# Patient Record
Sex: FEMALE | Race: WHITE
[De-identification: names, ages, dates, MRNs, and addresses within clinical notes are randomized per-mention and may not be internally consistent; named-entity substitution may affect disease eponyms.]

---

## 2020-04-26 ENCOUNTER — MESSAGE (OUTPATIENT)
Age: 35
End: 2020-04-26

## 2020-05-04 LAB
SARS-COV-2 IGG SERPL IA-ACNC: 0.3 RATIO
SARS-COV-2 IGG SERPL QL IA: NEGATIVE

## 2023-11-16 PROBLEM — Z00.00 ENCOUNTER FOR PREVENTIVE HEALTH EXAMINATION: Status: ACTIVE | Noted: 2023-11-16

## 2023-12-03 PROBLEM — Z00.01 ENCOUNTER FOR WELL ADULT EXAM WITH ABNORMAL FINDINGS: Status: ACTIVE | Noted: 2023-12-03

## 2023-12-07 ENCOUNTER — LABORATORY RESULT (OUTPATIENT)
Age: 38
End: 2023-12-07

## 2023-12-07 ENCOUNTER — APPOINTMENT (OUTPATIENT)
Dept: FAMILY MEDICINE | Facility: CLINIC | Age: 38
End: 2023-12-07
Payer: COMMERCIAL

## 2023-12-07 VITALS
TEMPERATURE: 98 F | HEIGHT: 64 IN | OXYGEN SATURATION: 98 % | HEART RATE: 106 BPM | WEIGHT: 211 LBS | DIASTOLIC BLOOD PRESSURE: 90 MMHG | SYSTOLIC BLOOD PRESSURE: 132 MMHG | BODY MASS INDEX: 36.02 KG/M2

## 2023-12-07 DIAGNOSIS — Z82.49 FAMILY HISTORY OF ISCHEMIC HEART DISEASE AND OTHER DISEASES OF THE CIRCULATORY SYSTEM: ICD-10-CM

## 2023-12-07 DIAGNOSIS — Z83.3 FAMILY HISTORY OF DIABETES MELLITUS: ICD-10-CM

## 2023-12-07 DIAGNOSIS — Z78.9 OTHER SPECIFIED HEALTH STATUS: ICD-10-CM

## 2023-12-07 DIAGNOSIS — Z86.2 PERSONAL HISTORY OF DISEASES OF THE BLOOD AND BLOOD-FORMING ORGANS AND CERTAIN DISORDERS INVOLVING THE IMMUNE MECHANISM: ICD-10-CM

## 2023-12-07 DIAGNOSIS — J45.909 UNSPECIFIED ASTHMA, UNCOMPLICATED: ICD-10-CM

## 2023-12-07 DIAGNOSIS — E66.9 OBESITY, UNSPECIFIED: ICD-10-CM

## 2023-12-07 DIAGNOSIS — Z00.01 ENCOUNTER FOR GENERAL ADULT MEDICAL EXAMINATION WITH ABNORMAL FINDINGS: ICD-10-CM

## 2023-12-07 PROCEDURE — 99385 PREV VISIT NEW AGE 18-39: CPT | Mod: 25

## 2023-12-07 PROCEDURE — 36415 COLL VENOUS BLD VENIPUNCTURE: CPT

## 2023-12-07 RX ORDER — ALBUTEROL SULFATE 90 UG/1
108 (90 BASE) INHALANT RESPIRATORY (INHALATION)
Qty: 1 | Refills: 3 | Status: ACTIVE | COMMUNITY
Start: 2023-12-07 | End: 1900-01-01

## 2023-12-08 LAB
ALBUMIN SERPL ELPH-MCNC: 4.5 G/DL
ALP BLD-CCNC: 114 U/L
ALT SERPL-CCNC: 28 U/L
ANION GAP SERPL CALC-SCNC: 10 MMOL/L
APPEARANCE: CLEAR
AST SERPL-CCNC: 31 U/L
BASOPHILS # BLD AUTO: 0.07 K/UL
BASOPHILS NFR BLD AUTO: 1.2 %
BILIRUB SERPL-MCNC: 0.2 MG/DL
BILIRUBIN URINE: NEGATIVE
BLOOD URINE: NEGATIVE
BUN SERPL-MCNC: 10 MG/DL
CALCIUM SERPL-MCNC: 9.2 MG/DL
CHLORIDE SERPL-SCNC: 105 MMOL/L
CHOLEST SERPL-MCNC: 189 MG/DL
CO2 SERPL-SCNC: 24 MMOL/L
COLOR: YELLOW
CREAT SERPL-MCNC: 0.68 MG/DL
EGFR: 114 ML/MIN/1.73M2
EOSINOPHIL # BLD AUTO: 0.26 K/UL
EOSINOPHIL NFR BLD AUTO: 4.3 %
ESTIMATED AVERAGE GLUCOSE: 128 MG/DL
GLUCOSE QUALITATIVE U: NEGATIVE MG/DL
GLUCOSE SERPL-MCNC: 94 MG/DL
HBA1C MFR BLD HPLC: 6.1 %
HCT VFR BLD CALC: 35.8 %
HDLC SERPL-MCNC: 41 MG/DL
HGB BLD-MCNC: 10.4 G/DL
IMM GRANULOCYTES NFR BLD AUTO: 0 %
KETONES URINE: NEGATIVE MG/DL
LDLC SERPL CALC-MCNC: 139 MG/DL
LEUKOCYTE ESTERASE URINE: NEGATIVE
LYMPHOCYTES # BLD AUTO: 3.28 K/UL
LYMPHOCYTES NFR BLD AUTO: 54.6 %
MAN DIFF?: NORMAL
MCHC RBC-ENTMCNC: 22.2 PG
MCHC RBC-ENTMCNC: 29.1 GM/DL
MCV RBC AUTO: 76.3 FL
MONOCYTES # BLD AUTO: 0.45 K/UL
MONOCYTES NFR BLD AUTO: 7.5 %
NEUTROPHILS # BLD AUTO: 1.95 K/UL
NEUTROPHILS NFR BLD AUTO: 32.4 %
NITRITE URINE: NEGATIVE
NONHDLC SERPL-MCNC: 148 MG/DL
PH URINE: 6
PLATELET # BLD AUTO: 665 K/UL
POTASSIUM SERPL-SCNC: 5 MMOL/L
PROT SERPL-MCNC: 8 G/DL
PROTEIN URINE: NORMAL MG/DL
RBC # BLD: 4.69 M/UL
RBC # FLD: 20.7 %
SODIUM SERPL-SCNC: 138 MMOL/L
SPECIFIC GRAVITY URINE: 1.03
TRIGL SERPL-MCNC: 48 MG/DL
TSH SERPL-ACNC: 2.5 UIU/ML
URATE SERPL-MCNC: 3.2 MG/DL
UROBILINOGEN URINE: 0.2 MG/DL
WBC # FLD AUTO: 6.01 K/UL

## 2023-12-29 PROBLEM — E78.5 HYPERLIPIDEMIA WITH LOW HDL: Status: ACTIVE | Noted: 2023-12-29

## 2023-12-29 PROBLEM — D64.9 ANEMIA: Status: ACTIVE | Noted: 2023-12-29

## 2023-12-29 PROBLEM — D75.839 THROMBOCYTOSIS: Status: ACTIVE | Noted: 2023-12-29

## 2023-12-29 NOTE — HISTORY OF PRESENT ILLNESS
[de-identified] : HGB was 10.4, MCV 35.8, RDW 20.7, platelets 665, HDL 41, , non-HDL cholesterol 148, HBA1c 6.1, estimated glucose 128. Pt was recalled back for abnormal test reports. Reports were reviewed with pt in details. Other blood tests came back wnl.

## 2024-01-04 ENCOUNTER — APPOINTMENT (OUTPATIENT)
Dept: FAMILY MEDICINE | Facility: CLINIC | Age: 39
End: 2024-01-04

## 2024-01-04 DIAGNOSIS — D64.9 ANEMIA, UNSPECIFIED: ICD-10-CM

## 2024-01-04 DIAGNOSIS — E78.6 HYPERLIPIDEMIA, UNSPECIFIED: ICD-10-CM

## 2024-01-04 DIAGNOSIS — D75.839 THROMBOCYTOSIS, UNSPECIFIED: ICD-10-CM

## 2024-01-04 DIAGNOSIS — E78.5 HYPERLIPIDEMIA, UNSPECIFIED: ICD-10-CM

## 2024-10-21 DIAGNOSIS — Z12.39 ENCOUNTER FOR OTHER SCREENING FOR MALIGNANT NEOPLASM OF BREAST: ICD-10-CM

## 2024-10-23 PROBLEM — E66.812 CLASS 2 OBESITY WITH BODY MASS INDEX (BMI) OF 36.0 TO 36.9 IN ADULT, UNSPECIFIED OBESITY TYPE, UNSPECIFIED WHETHER SERIOUS COMORBIDITY PRESENT: Status: ACTIVE | Noted: 2023-12-07

## 2024-10-29 ENCOUNTER — NON-APPOINTMENT (OUTPATIENT)
Age: 39
End: 2024-10-29

## 2024-10-29 ENCOUNTER — APPOINTMENT (OUTPATIENT)
Dept: FAMILY MEDICINE | Facility: CLINIC | Age: 39
End: 2024-10-29
Payer: COMMERCIAL

## 2024-10-29 VITALS
TEMPERATURE: 97.3 F | HEART RATE: 103 BPM | HEIGHT: 64 IN | WEIGHT: 203 LBS | SYSTOLIC BLOOD PRESSURE: 146 MMHG | DIASTOLIC BLOOD PRESSURE: 87 MMHG | OXYGEN SATURATION: 100 % | BODY MASS INDEX: 34.66 KG/M2

## 2024-10-29 DIAGNOSIS — M79.672 PAIN IN RIGHT FOOT: ICD-10-CM

## 2024-10-29 DIAGNOSIS — E66.812 OBESITY, CLASS 2: ICD-10-CM

## 2024-10-29 DIAGNOSIS — D64.9 ANEMIA, UNSPECIFIED: ICD-10-CM

## 2024-10-29 DIAGNOSIS — M79.671 PAIN IN RIGHT FOOT: ICD-10-CM

## 2024-10-29 PROCEDURE — 99214 OFFICE O/P EST MOD 30 MIN: CPT

## 2024-10-29 PROCEDURE — G2211 COMPLEX E/M VISIT ADD ON: CPT

## 2024-10-29 PROCEDURE — 36415 COLL VENOUS BLD VENIPUNCTURE: CPT

## 2024-10-30 LAB
25(OH)D3 SERPL-MCNC: 16.6 NG/ML
ALBUMIN SERPL ELPH-MCNC: 4 G/DL
ALP BLD-CCNC: 155 U/L
ALT SERPL-CCNC: 19 U/L
ANION GAP SERPL CALC-SCNC: 14 MMOL/L
APPEARANCE: CLEAR
AST SERPL-CCNC: 19 U/L
BASOPHILS # BLD AUTO: 0.04 K/UL
BASOPHILS NFR BLD AUTO: 0.5 %
BILIRUB SERPL-MCNC: 0.2 MG/DL
BILIRUBIN URINE: NEGATIVE
BLOOD URINE: NEGATIVE
BUN SERPL-MCNC: 12 MG/DL
CALCIUM SERPL-MCNC: 9.6 MG/DL
CHLORIDE SERPL-SCNC: 99 MMOL/L
CHOLEST SERPL-MCNC: 206 MG/DL
CO2 SERPL-SCNC: 23 MMOL/L
COLOR: YELLOW
CREAT SERPL-MCNC: 0.72 MG/DL
EGFR: 108 ML/MIN/1.73M2
EOSINOPHIL # BLD AUTO: 0.06 K/UL
EOSINOPHIL NFR BLD AUTO: 0.8 %
ESTIMATED AVERAGE GLUCOSE: 128 MG/DL
FERRITIN SERPL-MCNC: 27 NG/ML
GLUCOSE QUALITATIVE U: NEGATIVE MG/DL
GLUCOSE SERPL-MCNC: 101 MG/DL
HBA1C MFR BLD HPLC: 6.1 %
HCT VFR BLD CALC: 32.9 %
HDLC SERPL-MCNC: 30 MG/DL
HGB BLD-MCNC: 9.3 G/DL
IMM GRANULOCYTES NFR BLD AUTO: 0.3 %
IRON SATN MFR SERPL: 6 %
IRON SERPL-MCNC: 21 UG/DL
KETONES URINE: NEGATIVE MG/DL
LDLC SERPL CALC-MCNC: 164 MG/DL
LEUKOCYTE ESTERASE URINE: NEGATIVE
LYMPHOCYTES # BLD AUTO: 3.09 K/UL
LYMPHOCYTES NFR BLD AUTO: 41.8 %
MAN DIFF?: NORMAL
MCHC RBC-ENTMCNC: 21.4 PG
MCHC RBC-ENTMCNC: 28.3 G/DL
MCV RBC AUTO: 75.6 FL
MONOCYTES # BLD AUTO: 0.51 K/UL
MONOCYTES NFR BLD AUTO: 6.9 %
NEUTROPHILS # BLD AUTO: 3.68 K/UL
NEUTROPHILS NFR BLD AUTO: 49.7 %
NITRITE URINE: NEGATIVE
NONHDLC SERPL-MCNC: 176 MG/DL
PH URINE: 5.5
PLATELET # BLD AUTO: 1065 K/UL
POTASSIUM SERPL-SCNC: 4.9 MMOL/L
PROT SERPL-MCNC: 8 G/DL
PROTEIN URINE: NORMAL MG/DL
RBC # BLD: 4.35 M/UL
RBC # FLD: 20.1 %
SODIUM SERPL-SCNC: 136 MMOL/L
SPECIFIC GRAVITY URINE: 1.02
TIBC SERPL-MCNC: 338 UG/DL
TRIGL SERPL-MCNC: 67 MG/DL
TSH SERPL-ACNC: 1.87 UIU/ML
UIBC SERPL-MCNC: 318 UG/DL
URATE SERPL-MCNC: 3 MG/DL
UROBILINOGEN URINE: 0.2 MG/DL
WBC # FLD AUTO: 7.4 K/UL

## 2024-11-10 PROBLEM — R74.8 ELEVATED ALKALINE PHOSPHATASE LEVEL: Status: ACTIVE | Noted: 2024-11-10

## 2024-11-10 PROBLEM — R73.03 PREDIABETES: Status: ACTIVE | Noted: 2024-11-10

## 2024-11-10 PROBLEM — E55.9 VITAMIN D DEFICIENCY: Status: ACTIVE | Noted: 2024-11-10

## 2024-11-10 PROBLEM — D50.9 IRON DEFICIENCY ANEMIA: Status: ACTIVE | Noted: 2024-11-10

## 2024-11-15 ENCOUNTER — APPOINTMENT (OUTPATIENT)
Dept: ULTRASOUND IMAGING | Facility: CLINIC | Age: 39
End: 2024-11-15
Payer: COMMERCIAL

## 2024-11-15 ENCOUNTER — APPOINTMENT (OUTPATIENT)
Dept: FAMILY MEDICINE | Facility: CLINIC | Age: 39
End: 2024-11-15
Payer: COMMERCIAL

## 2024-11-15 ENCOUNTER — APPOINTMENT (OUTPATIENT)
Dept: CARDIOLOGY | Facility: CLINIC | Age: 39
End: 2024-11-15
Payer: COMMERCIAL

## 2024-11-15 VITALS — SYSTOLIC BLOOD PRESSURE: 130 MMHG | DIASTOLIC BLOOD PRESSURE: 84 MMHG

## 2024-11-15 VITALS
SYSTOLIC BLOOD PRESSURE: 140 MMHG | OXYGEN SATURATION: 99 % | HEIGHT: 64 IN | WEIGHT: 206 LBS | HEART RATE: 93 BPM | BODY MASS INDEX: 35.17 KG/M2 | TEMPERATURE: 97.2 F | DIASTOLIC BLOOD PRESSURE: 84 MMHG

## 2024-11-15 DIAGNOSIS — R21 RASH AND OTHER NONSPECIFIC SKIN ERUPTION: ICD-10-CM

## 2024-11-15 DIAGNOSIS — D50.9 IRON DEFICIENCY ANEMIA, UNSPECIFIED: ICD-10-CM

## 2024-11-15 DIAGNOSIS — E78.6 HYPERLIPIDEMIA, UNSPECIFIED: ICD-10-CM

## 2024-11-15 DIAGNOSIS — R73.03 PREDIABETES.: ICD-10-CM

## 2024-11-15 DIAGNOSIS — R06.02 SHORTNESS OF BREATH: ICD-10-CM

## 2024-11-15 DIAGNOSIS — R00.2 PALPITATIONS: ICD-10-CM

## 2024-11-15 DIAGNOSIS — D75.839 THROMBOCYTOSIS, UNSPECIFIED: ICD-10-CM

## 2024-11-15 DIAGNOSIS — R03.0 ELEVATED BLOOD-PRESSURE READING, W/OUT DIAGNOSIS OF HYPERTENSION: ICD-10-CM

## 2024-11-15 DIAGNOSIS — R07.89 OTHER CHEST PAIN: ICD-10-CM

## 2024-11-15 DIAGNOSIS — E78.5 HYPERLIPIDEMIA, UNSPECIFIED: ICD-10-CM

## 2024-11-15 DIAGNOSIS — E55.9 VITAMIN D DEFICIENCY, UNSPECIFIED: ICD-10-CM

## 2024-11-15 DIAGNOSIS — R74.8 ABNORMAL LEVELS OF OTHER SERUM ENZYMES: ICD-10-CM

## 2024-11-15 PROCEDURE — G2211 COMPLEX E/M VISIT ADD ON: CPT

## 2024-11-15 PROCEDURE — 76856 US EXAM PELVIC COMPLETE: CPT

## 2024-11-15 PROCEDURE — 99214 OFFICE O/P EST MOD 30 MIN: CPT

## 2024-11-15 PROCEDURE — 93242 EXT ECG>48HR<7D RECORDING: CPT

## 2024-11-15 PROCEDURE — 93306 TTE W/DOPPLER COMPLETE: CPT

## 2024-11-15 PROCEDURE — 99204 OFFICE O/P NEW MOD 45 MIN: CPT

## 2024-11-15 PROCEDURE — 76700 US EXAM ABDOM COMPLETE: CPT

## 2024-11-15 PROCEDURE — 93246 EXT ECG>7D<15D RECORDING: CPT

## 2024-11-15 RX ORDER — ERGOCALCIFEROL 1.25 MG/1
1.25 MG CAPSULE, LIQUID FILLED ORAL
Qty: 13 | Refills: 1 | Status: ACTIVE | COMMUNITY
Start: 2024-11-15 | End: 1900-01-01

## 2024-11-15 RX ORDER — SIMVASTATIN 5 MG/1
5 TABLET, FILM COATED ORAL DAILY
Qty: 90 | Refills: 1 | Status: ACTIVE | COMMUNITY
Start: 2024-11-15 | End: 1900-01-01

## 2024-11-15 RX ORDER — CHLORHEXIDINE GLUCONATE 4 %
325 (65 FE) LIQUID (ML) TOPICAL 3 TIMES DAILY
Qty: 90 | Refills: 3 | Status: ACTIVE | COMMUNITY
Start: 2024-11-15

## 2024-11-18 ENCOUNTER — TRANSCRIPTION ENCOUNTER (OUTPATIENT)
Age: 39
End: 2024-11-18

## 2024-11-27 ENCOUNTER — APPOINTMENT (OUTPATIENT)
Dept: OBGYN | Facility: CLINIC | Age: 39
End: 2024-11-27
Payer: COMMERCIAL

## 2024-11-27 ENCOUNTER — NON-APPOINTMENT (OUTPATIENT)
Age: 39
End: 2024-11-27

## 2024-11-27 VITALS
HEIGHT: 64 IN | SYSTOLIC BLOOD PRESSURE: 138 MMHG | HEART RATE: 93 BPM | TEMPERATURE: 98 F | WEIGHT: 204 LBS | OXYGEN SATURATION: 98 % | RESPIRATION RATE: 16 BRPM | BODY MASS INDEX: 34.83 KG/M2 | DIASTOLIC BLOOD PRESSURE: 88 MMHG

## 2024-11-27 DIAGNOSIS — D25.0 INTRAMURAL LEIOMYOMA OF UTERUS: ICD-10-CM

## 2024-11-27 DIAGNOSIS — D25.1 INTRAMURAL LEIOMYOMA OF UTERUS: ICD-10-CM

## 2024-11-27 DIAGNOSIS — N92.0 EXCESSIVE AND FREQUENT MENSTRUATION WITH REGULAR CYCLE: ICD-10-CM

## 2024-11-27 PROCEDURE — 99204 OFFICE O/P NEW MOD 45 MIN: CPT

## 2024-12-06 ENCOUNTER — APPOINTMENT (OUTPATIENT)
Dept: CARDIOLOGY | Facility: CLINIC | Age: 39
End: 2024-12-06
Payer: COMMERCIAL

## 2024-12-06 DIAGNOSIS — R00.2 PALPITATIONS: ICD-10-CM

## 2024-12-06 PROCEDURE — 93244 EXT ECG>48HR<7D REV&INTERPJ: CPT

## 2024-12-06 PROCEDURE — 93248 EXT ECG>7D<15D REV&INTERPJ: CPT

## 2025-02-07 ENCOUNTER — APPOINTMENT (OUTPATIENT)
Dept: CARDIOLOGY | Facility: CLINIC | Age: 40
End: 2025-02-07
Payer: COMMERCIAL

## 2025-02-07 ENCOUNTER — NON-APPOINTMENT (OUTPATIENT)
Age: 40
End: 2025-02-07

## 2025-02-07 VITALS
WEIGHT: 201 LBS | RESPIRATION RATE: 16 BRPM | HEART RATE: 89 BPM | SYSTOLIC BLOOD PRESSURE: 141 MMHG | HEIGHT: 64 IN | BODY MASS INDEX: 34.31 KG/M2 | TEMPERATURE: 96.9 F | DIASTOLIC BLOOD PRESSURE: 87 MMHG | OXYGEN SATURATION: 99 %

## 2025-02-07 VITALS — DIASTOLIC BLOOD PRESSURE: 86 MMHG | SYSTOLIC BLOOD PRESSURE: 136 MMHG

## 2025-02-07 DIAGNOSIS — E78.6 HYPERLIPIDEMIA, UNSPECIFIED: ICD-10-CM

## 2025-02-07 DIAGNOSIS — E78.5 HYPERLIPIDEMIA, UNSPECIFIED: ICD-10-CM

## 2025-02-07 PROCEDURE — 99213 OFFICE O/P EST LOW 20 MIN: CPT

## 2025-02-07 PROCEDURE — 93000 ELECTROCARDIOGRAM COMPLETE: CPT

## 2025-04-14 ENCOUNTER — RX RENEWAL (OUTPATIENT)
Age: 40
End: 2025-04-14

## 2025-05-16 ENCOUNTER — APPOINTMENT (OUTPATIENT)
Dept: FAMILY MEDICINE | Facility: CLINIC | Age: 40
End: 2025-05-16

## 2025-05-16 DIAGNOSIS — E78.5 HYPERLIPIDEMIA, UNSPECIFIED: ICD-10-CM

## 2025-05-16 DIAGNOSIS — D75.839 THROMBOCYTOSIS, UNSPECIFIED: ICD-10-CM

## 2025-05-16 DIAGNOSIS — R73.03 PREDIABETES.: ICD-10-CM

## 2025-05-16 DIAGNOSIS — E55.9 VITAMIN D DEFICIENCY, UNSPECIFIED: ICD-10-CM

## 2025-05-16 DIAGNOSIS — D50.9 IRON DEFICIENCY ANEMIA, UNSPECIFIED: ICD-10-CM

## 2025-05-16 DIAGNOSIS — E78.6 HYPERLIPIDEMIA, UNSPECIFIED: ICD-10-CM

## 2025-05-16 DIAGNOSIS — E66.812 OBESITY, CLASS 2: ICD-10-CM

## 2025-06-20 ENCOUNTER — APPOINTMENT (OUTPATIENT)
Dept: FAMILY MEDICINE | Facility: CLINIC | Age: 40
End: 2025-06-20

## 2025-06-20 VITALS
WEIGHT: 208 LBS | DIASTOLIC BLOOD PRESSURE: 92 MMHG | HEIGHT: 64 IN | HEART RATE: 94 BPM | TEMPERATURE: 98.2 F | SYSTOLIC BLOOD PRESSURE: 138 MMHG | OXYGEN SATURATION: 96 % | BODY MASS INDEX: 35.51 KG/M2

## 2025-06-20 PROCEDURE — 99213 OFFICE O/P EST LOW 20 MIN: CPT

## 2025-06-20 RX ORDER — FLUTICASONE PROPIONATE AND SALMETEROL 250; 50 UG/1; UG/1
250-50 POWDER RESPIRATORY (INHALATION)
Qty: 3 | Refills: 1 | Status: ACTIVE | COMMUNITY
Start: 2025-06-20 | End: 1900-01-01

## 2025-06-20 RX ORDER — PREDNISONE 20 MG/1
20 TABLET ORAL
Qty: 10 | Refills: 0 | Status: ACTIVE | COMMUNITY
Start: 2025-06-20 | End: 1900-01-01

## 2025-07-08 ENCOUNTER — RX RENEWAL (OUTPATIENT)
Age: 40
End: 2025-07-08

## 2025-07-24 PROBLEM — J30.89 ALLERGIC RHINITIS DUE TO OTHER ALLERGIC TRIGGER: Status: ACTIVE | Noted: 2025-06-20

## 2025-07-31 ENCOUNTER — APPOINTMENT (OUTPATIENT)
Dept: FAMILY MEDICINE | Facility: CLINIC | Age: 40
End: 2025-07-31

## 2025-07-31 DIAGNOSIS — E78.5 HYPERLIPIDEMIA, UNSPECIFIED: ICD-10-CM

## 2025-07-31 DIAGNOSIS — E55.9 VITAMIN D DEFICIENCY, UNSPECIFIED: ICD-10-CM

## 2025-07-31 DIAGNOSIS — D50.9 IRON DEFICIENCY ANEMIA, UNSPECIFIED: ICD-10-CM

## 2025-07-31 DIAGNOSIS — D75.839 THROMBOCYTOSIS, UNSPECIFIED: ICD-10-CM

## 2025-07-31 DIAGNOSIS — R03.0 ELEVATED BLOOD-PRESSURE READING, W/OUT DIAGNOSIS OF HYPERTENSION: ICD-10-CM

## 2025-07-31 DIAGNOSIS — E78.6 HYPERLIPIDEMIA, UNSPECIFIED: ICD-10-CM

## 2025-07-31 DIAGNOSIS — R73.03 PREDIABETES.: ICD-10-CM

## 2025-07-31 DIAGNOSIS — E66.812 OBESITY, CLASS 2: ICD-10-CM

## 2025-07-31 DIAGNOSIS — Z00.01 ENCOUNTER FOR GENERAL ADULT MEDICAL EXAMINATION WITH ABNORMAL FINDINGS: ICD-10-CM

## 2025-07-31 DIAGNOSIS — J30.89 OTHER ALLERGIC RHINITIS: ICD-10-CM

## 2025-08-08 ENCOUNTER — APPOINTMENT (OUTPATIENT)
Dept: CARDIOLOGY | Facility: CLINIC | Age: 40
End: 2025-08-08